# Patient Record
Sex: MALE | Race: WHITE | Employment: FULL TIME | URBAN - METROPOLITAN AREA
[De-identification: names, ages, dates, MRNs, and addresses within clinical notes are randomized per-mention and may not be internally consistent; named-entity substitution may affect disease eponyms.]

---

## 2021-05-29 ENCOUNTER — RECORDS - HEALTHEAST (OUTPATIENT)
Dept: ADMINISTRATIVE | Facility: CLINIC | Age: 56
End: 2021-05-29

## 2021-09-28 ENCOUNTER — VIRTUAL VISIT (OUTPATIENT)
Dept: FAMILY MEDICINE | Facility: CLINIC | Age: 56
End: 2021-09-28

## 2021-09-28 DIAGNOSIS — A05.9 FOOD POISONING: ICD-10-CM

## 2021-09-28 PROCEDURE — 99202 OFFICE O/P NEW SF 15 MIN: CPT | Mod: 95 | Performed by: INTERNAL MEDICINE

## 2021-09-28 NOTE — PROGRESS NOTES
Sung is a 56 year old who is being evaluated via a billable telephone visit.      What phone number would you like to be contacted at? Mobile on file  How would you like to obtain your AVS? Mail a copy    Assessment & Plan     1.  Food poisoning suspected versus viral gastroenteritis.  Symptoms have resolved in 48 hours.  A note provided to patient indicating he could return to work.        No follow-ups on file.    Angel Melendez MD  [facility]   Sung is a 56 year old who presents for the following health issues     HPI     56-year-old gentleman stated that he had seafood on Sunday following which on Monday he developed diarrhea without any blood or pus in the stool.  Some cramping.  No nausea vomiting.  No fever or chills.  Today his symptoms have gradually improved and pretty much resolved.  Still feels a little weak.  He is requesting a letter indicating that he could return to work.  He has no other symptoms.  Denies upper respiratory symptoms.      Review of Systems   Constitutional, HEENT, cardiovascular, pulmonary, GI, , musculoskeletal, neuro, skin, endocrine and psych systems are negative, except as otherwise noted.      Objective           Vitals:  No vitals were obtained today due to virtual visit.    Physical Exam   healthy, alert and no distress  PSYCH: Alert and oriented times 3; coherent speech, normal   rate and volume, able to articulate logical thoughts, able   to abstract reason, no tangential thoughts, no hallucinations   or delusions  His affect is normal  RESP: No cough, no audible wheezing, able to talk in full sentences  Remainder of exam unable to be completed due to telephone visits                Phone call duration: 10 minutes

## 2021-09-28 NOTE — LETTER
September 28, 2021      Sung Blanca  [address]  [facility] [address]        To Whom It May Concern:    Sung Blnaca was evaluated today by me.  He is medically fit and can return to work on 10/29/2021      Sincerely,        Angel Melendez MD